# Patient Record
Sex: FEMALE | Race: OTHER
[De-identification: names, ages, dates, MRNs, and addresses within clinical notes are randomized per-mention and may not be internally consistent; named-entity substitution may affect disease eponyms.]

---
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
## 2024-10-04 ENCOUNTER — HOSPITAL ENCOUNTER (EMERGENCY)
Dept: HOSPITAL 15 - ER | Age: 24
Discharge: HOME | End: 2024-10-04
Payer: COMMERCIAL

## 2024-10-04 VITALS — HEIGHT: 57 IN | BODY MASS INDEX: 29.3 KG/M2 | WEIGHT: 135.8 LBS

## 2024-10-04 VITALS
SYSTOLIC BLOOD PRESSURE: 101 MMHG | DIASTOLIC BLOOD PRESSURE: 68 MMHG | HEART RATE: 122 BPM | TEMPERATURE: 98.1 F | OXYGEN SATURATION: 99 %

## 2024-10-04 VITALS — RESPIRATION RATE: 16 BRPM

## 2024-10-04 DIAGNOSIS — K52.9: Primary | ICD-10-CM

## 2024-10-04 DIAGNOSIS — Z91.040: ICD-10-CM

## 2024-10-04 LAB
ANION GAP SERPL CALCULATED.3IONS-SCNC: 9 MMOL/L (ref 5–15)
BUN SERPL-MCNC: 15 MG/DL (ref 9–23)
BUN/CREAT SERPL: 20.8 (ref 10–20)
CALCIUM SERPL-MCNC: 9.6 MG/DL (ref 8.7–10.4)
CHLORIDE SERPL-SCNC: 107 MMOL/L (ref 98–107)
CO2 SERPL-SCNC: 24 MMOL/L (ref 20–31)
GLUCOSE SERPL-MCNC: 111 MG/DL (ref 74–106)
HCT VFR BLD AUTO: 40.7 % (ref 36–46)
HGB BLD-MCNC: 14.1 G/DL (ref 12.2–16.2)
MCH RBC QN AUTO: 31.3 PG (ref 28–32)
MCV RBC AUTO: 90.6 FL (ref 80–100)
NRBC BLD QL AUTO: 0 %
POTASSIUM SERPL-SCNC: 3.7 MMOL/L (ref 3.5–5.1)
PROT UR STRIP.AUTO-MCNC: (no result) MG/DL
SODIUM SERPL-SCNC: 140 MMOL/L (ref 136–145)

## 2024-10-04 PROCEDURE — 96361 HYDRATE IV INFUSION ADD-ON: CPT

## 2024-10-04 PROCEDURE — 85025 COMPLETE CBC W/AUTO DIFF WBC: CPT

## 2024-10-04 PROCEDURE — 99283 EMERGENCY DEPT VISIT LOW MDM: CPT

## 2024-10-04 PROCEDURE — 96374 THER/PROPH/DIAG INJ IV PUSH: CPT

## 2024-10-04 PROCEDURE — 36415 COLL VENOUS BLD VENIPUNCTURE: CPT

## 2024-10-04 PROCEDURE — 81001 URINALYSIS AUTO W/SCOPE: CPT

## 2024-10-04 PROCEDURE — 80048 BASIC METABOLIC PNL TOTAL CA: CPT

## 2024-10-04 RX ADMIN — SODIUM CHLORIDE ONE MLS/HR: 0.9 INJECTION, SOLUTION INTRAVENOUS at 09:36

## 2024-10-04 RX ADMIN — ONDANSETRON HYDROCHLORIDE ONE MG: 2 INJECTION, SOLUTION INTRAMUSCULAR; INTRAVENOUS at 09:38

## 2025-02-12 ENCOUNTER — HOSPITAL ENCOUNTER (EMERGENCY)
Dept: HOSPITAL 15 - ER | Age: 25
Discharge: HOME | End: 2025-02-12
Payer: COMMERCIAL

## 2025-02-12 VITALS
RESPIRATION RATE: 18 BRPM | DIASTOLIC BLOOD PRESSURE: 69 MMHG | OXYGEN SATURATION: 97 % | SYSTOLIC BLOOD PRESSURE: 137 MMHG | HEART RATE: 120 BPM

## 2025-02-12 VITALS — TEMPERATURE: 97.9 F

## 2025-02-12 VITALS — HEIGHT: 59 IN | WEIGHT: 130.29 LBS | BODY MASS INDEX: 26.27 KG/M2

## 2025-02-12 DIAGNOSIS — Z20.822: ICD-10-CM

## 2025-02-12 DIAGNOSIS — N39.0: Primary | ICD-10-CM

## 2025-02-12 LAB
ANION GAP SERPL CALCULATED.3IONS-SCNC: 8 MMOL/L (ref 5–15)
BUN SERPL-MCNC: 12 MG/DL (ref 9–23)
BUN/CREAT SERPL: 17.9 (ref 10–20)
CALCIUM SERPL-MCNC: 9.9 MG/DL (ref 8.7–10.4)
CHLORIDE SERPL-SCNC: 102 MMOL/L (ref 98–107)
CO2 SERPL-SCNC: 26 MMOL/L (ref 20–31)
GLUCOSE SERPL-MCNC: 109 MG/DL (ref 74–106)
HCT VFR BLD AUTO: 41.2 % (ref 36–46)
HGB BLD-MCNC: 13.9 G/DL (ref 12.2–16.2)
MCH RBC QN AUTO: 30 PG (ref 28–32)
MCV RBC AUTO: 88.9 FL (ref 80–100)
NRBC BLD QL AUTO: 0.1 %
POTASSIUM SERPL-SCNC: 3.5 MMOL/L (ref 3.5–5.1)
PROT UR STRIP.AUTO-MCNC: (no result) MG/DL
SARS-COV+SARS-COV-2 AG RESP QL IA.RAPID: NEGATIVE
SODIUM SERPL-SCNC: 136 MMOL/L (ref 136–145)

## 2025-02-12 PROCEDURE — 80048 BASIC METABOLIC PNL TOTAL CA: CPT

## 2025-02-12 PROCEDURE — 87804 INFLUENZA ASSAY W/OPTIC: CPT

## 2025-02-12 PROCEDURE — 85025 COMPLETE CBC W/AUTO DIFF WBC: CPT

## 2025-02-12 PROCEDURE — 96372 THER/PROPH/DIAG INJ SC/IM: CPT

## 2025-02-12 PROCEDURE — 81025 URINE PREGNANCY TEST: CPT

## 2025-02-12 PROCEDURE — 87426 SARSCOV CORONAVIRUS AG IA: CPT

## 2025-02-12 PROCEDURE — 81001 URINALYSIS AUTO W/SCOPE: CPT

## 2025-02-12 PROCEDURE — 36415 COLL VENOUS BLD VENIPUNCTURE: CPT

## 2025-02-12 PROCEDURE — 99283 EMERGENCY DEPT VISIT LOW MDM: CPT

## 2025-02-12 RX ADMIN — ACETAMINOPHEN ONE MG: 325 TABLET ORAL at 08:20

## 2025-02-12 RX ADMIN — LIDOCAINE HYDROCHLORIDE ONE ML: 10 INJECTION, SOLUTION INFILTRATION; PERINEURAL at 09:41

## 2025-02-12 RX ADMIN — DEXTROSE ONE MG: 50 INJECTION, SOLUTION INTRAVENOUS at 09:32

## 2025-02-12 RX ADMIN — ONDANSETRON ONE MG: 4 TABLET, ORALLY DISINTEGRATING ORAL at 08:01

## 2025-02-12 NOTE — ED.PDOC
History of Present Illness


HPI Comments


24 year old with no pertinent MHx presents for we will food poisoning.  Patient 

currently endorsing nausea vomiting diarrhea.


Symptoms started yesterday evening after eating Cole in the box.  Started nausea

then symptoms progressed to vomiting and diarrhea.  Patient also endorses lower 

back pain dizziness that comes and goes with no specific pattern.  Has not tried

medications for the symptoms listed above.


LMP 1 12th 25


Denies fevers chills night sweats unintentional weight loss


Denies nausea vomiting diarrhea


Denies blood in the stool


Denies sick contact with similar symptoms


Denies new foods/medications


Denies family history of GI cancer


Denies urgency, frequency, hematuria


Denies vaginal discharge


Chief Complaint:  Flu like


Time Seen by MD:  06:48


Reviewed Notes:  Nurses Notes, Medications, Allergies


Information Source:  Patient





Past Medical History


PAST MEDICAL HISTORY:  Denies


Surgical History:  Denies all surgeries


GYN History:  No Pertinent GYN History





Family History


Family History:  Reviewed,noncontributory to illness, Unknown





Social History


Smoker:  Non-Smoker


Alcohol:  Denies ETOH Use


Drugs:  Denies Drug Use


Lives In:  Home





All Other Systems:  Reviewed and Negative (Per HPI)





Physical Exam


General Appearance:  No Apparent Distress, Normal


HEENT:  Normal ENT Inspection, Pharynx Normal, TMs Normal


Neck:  Full Range of Motion, Non-Tender, Normal, Normal Inspection


Respiratory:  Chest Non-Tender, Lungs Clear, No Accessory Muscle Use, No 

Respiratory Distress, Normal Breath Sounds


Cardiovascular:  No Edema, No JVD, No Murmur, No Gallop, Normal Peripheral 

Pulses, Regular Rate/Rhythm


Breast Exam:  Deferred


Gastrointestinal:  No Organomegaly, Non Tender, No Pulsatile Mass, Normal Bowel 

Sounds, Soft


Genitalia:  Deferred


Pelvic:  Deferred


Rectal:  Deferred


Extremities:  No calf tenderness, Normal capillary refill, Normal inspection, 

Normal range of motion, Non-tender, No pedal edema


Musculoskeletal :  


   Apperance:  Normal


Neurologic:  Alert, No Motor Deficits, Normal Affect, Normal Mood, No Sensory 

Deficits


Cerebellar Function:  Normal


Reflexes:  Normal


Skin:  Dry, Normal Color, Warm


Lymphatic:  No Adenopathy





Was a procedure done?


Was a procedure done?:  No





Fever Differential Dx


Differential Diagnosis:  Influenza, UTI, Viral Syndrome, Other





X-Ray, Labs, Meds, VS





                                   Vital Signs








  Date Time  Temp Pulse Resp B/P (MAP) Pulse Ox O2 Delivery O2 Flow Rate FiO2


 


2/12/25 08:20 100.2       


 


2/12/25 06:19 100.2 120 18 137/69 (91) 97   





 100.2       


 


2/12/25 06:19  120 18  97 Room Air  


 


2/12/25 06:19 100.2 120 18 137/69 (91) 97   








                                       Lab








Test


 2/12/25


08:25 2/12/25


08:00 2/12/25


07:43 2/12/25


07:42 Range/Units


 


 


White Blood Count


 7.7 


 


 


 


 4.4-10.8


10^3/uL


 


Red Blood Count


 4.63 


 


 


 


 4.0-5.20


10^6/uL


 


Hemoglobin 13.9     12.2-16.2  g/dL


 


Hematocrit 41.2     36.0-46.0  %


 


Mean Corpuscular Volume 88.9     80.0-100.0  fL


 


Mean Corpuscular Hemoglobin 30.0     28.0-32.0  pg


 


Mean Corpuscular Hemoglobin


Concent 33.7 


 


 


 


 32.0-36.0  g/dL





 


Red Cell Distribution Width 13.5     11.8-14.3  %


 


Platelet Count


 248 


 


 


 


 140-450


10^3/uL


 


Mean Platelet Volume 8.6     6.9-10.8  fL


 


Neutrophils (%) (Auto) 86.0 H    37.0-80.0  %


 


Lymphocytes (%) (Auto) 7.5 L    10.0-50.0  %


 


Monocytes (%) (Auto) 5.8     0.0-12.0  %


 


Eosinophils (%) (Auto) 0.5     0.0-7.0  %


 


Basophils (%) (Auto) 0.2     0.0-2.0  %


 


Neutrophils # (Auto)


 6.6 


 


 


 


 1.6-8.6  10


^3/uL


 


Lymphocytes # (Auto)


 0.6 


 


 


 


 0.4-5.4  10


^3/uL


 


Monocytes # (Auto) 0.4     0-1.3  10 ^3/uL


 


Eosinophils # (Auto) 0     0-0.8  10 ^3/uL


 


Basophils # (Auto) 0     0-0.2  10 ^3/uL


 


Nucleated Red Blood Cells 0.1      %


 


Sodium Level 136     136-145  mmol/L


 


Potassium Level 3.5     3.5-5.1  mmol/L


 


Chloride Level 102       mmol/L


 


Carbon Dioxide Level 26     20-31  mmol/L


 


Anion Gap 8     5-15  


 


Blood Urea Nitrogen 12     9-23  mg/dL


 


Creatinine


 0.67 


 


 


 


 0.550-1.02


mg/dL


 


Glomerular Filtration Rate


Calc 127 


 


 


 


 >90  mL/min





 


BUN/Creatinine Ratio 17.9     10.0-20.0  


 


Serum Glucose 109 H      mg/dL


 


Calcium Level 9.9     8.7-10.4  mg/dL


 


Urine Color  Yellow    Yellow  


 


Urine Clarity  Turbid H   Clear  


 


Urine pH  8.5    5.0-9.0  


 


Urine Specific Gravity  1.028    1.001-1.035  


 


Urine Protein  1+ H   Negative  


 


Urine Ketones  Negative    Negative  


 


Urine Blood  Trace H   Negative  /uL


 


Urine Nitrite  Negative    Negative  


 


Urine Bilirubin  Negative    Negative  


 


Urine Urobilinogen  Normal    Negative  mg/dL


 


Urine Leukocyte Esterase  2+    Negative  /uL


 


Urine RBC  7    0 - 4  /hpf


 


Urine Microscopic WBC  10 H   0-5  /HPF


 


Urine Squamous Epithelial


Cells 


 Mod 


 


 


 <5  /hpf





 


Urine Bacteria  None seen    None Seen  /hpf


 


Urine Mucus  Few    None Seen  


 


Urine Glucose  Normal    Normal  mg/dL


 


Urine Pregnancy Test  Negative    Negative  


 


Influenza Type A Antigen   Negative   Negative  


 


Influenza Type B Antigen   Negative   Negative  


 


SARS-CoV-2 Antigen (Rapid)    Negative  NEGATIVE  








                               Current Medications








 Medications


  (Trade)  Dose


 Ordered  Sig/Joe


 Route  Start Time


 Stop Time Status Last Admin


 


 Ondansetron HCl


  (Zofran Po)  4 mg  ONCE  ONCE


 PO  2/12/25 07:45


 2/12/25 07:46 DC 2/12/25 08:01





 


 Acetaminophen


  (Tylenol Tablet)  650 mg  ONCE  ONCE


 PO  2/12/25 07:45


 2/12/25 07:46 DC 2/12/25 08:20





 


 Ceftriaxone Sodium


  (Rocephin)  1,000 mg  ONCE  ONCE


 IM  2/12/25 09:15


 2/12/25 09:24 DC 2/12/25 09:32











X-Ray, Labs, Meds, VS Comment


The patient presents with signs and symptoms that are consistent with a urinary 

tract infection.  


The urinalysis confirms the diagnosis. 


There does not appear to be any signs or symptoms of pyelonephritis or sepsis. 


In the ED, the patient was treated with Ceftriaxone


The patient was overall well appearing and safe for discharge home. Able to 

tolerate PO. Prescriptions for antibiotics given. The patient is instructed to 

follow up with primary care physician. Return precautions to the ED discussed.


Time of 1ST Reevaluation:  08:01


Reevaluation 1ST:  Improved


Patient Education/Counseling:  Diagnosis, Treatment


Family Education/Counseling:  Diagnosis, Treatment





Departure 1


Departure


Time of Disposition:  09:42


Impression:  


   Primary Impression:  


   UTI (urinary tract infection)


   Qualified Codes:  N30.00 - Acute cystitis without hematuria


Disposition:  01 HOME / SELF CARE / HOMELESS


Condition:  Stable


e-Prescriptions


Nitrofurantoin Monohydrate Mac (Macrobid) 100 Mg Cap


100 MG PO BID for 7 Days, #14 CAP 0 Refills


   Prov: DESIREE REEVES NP         2/12/25


Discharged With:  Self





Critical Care Note


Critical Care Time?:  No





Stability


Stability form required:  No





Heart Score


Heart Score:  








Heart Score Response (Comments) Value


 


History N/A 0


 


EKG N/A 0


 


Age N/A 0


 


Risk Factors N/A 0


 


Troponin N/A 0


 


Total  0

















DESIREE REEVES NP                Feb 12, 2025 08:02